# Patient Record
Sex: FEMALE | Race: WHITE | NOT HISPANIC OR LATINO | Employment: STUDENT | URBAN - METROPOLITAN AREA
[De-identification: names, ages, dates, MRNs, and addresses within clinical notes are randomized per-mention and may not be internally consistent; named-entity substitution may affect disease eponyms.]

---

## 2023-07-06 ENCOUNTER — HOSPITAL ENCOUNTER (EMERGENCY)
Facility: CLINIC | Age: 23
Discharge: HOME | End: 2023-07-06
Attending: EMERGENCY MEDICINE | Admitting: EMERGENCY MEDICINE

## 2023-07-06 VITALS
HEIGHT: 64 IN | SYSTOLIC BLOOD PRESSURE: 119 MMHG | DIASTOLIC BLOOD PRESSURE: 72 MMHG | WEIGHT: 127.87 LBS | RESPIRATION RATE: 16 BRPM | HEART RATE: 76 BPM | BODY MASS INDEX: 21.83 KG/M2 | TEMPERATURE: 98.2 F | OXYGEN SATURATION: 99 %

## 2023-07-06 DIAGNOSIS — F41.0 PANIC ATTACK: ICD-10-CM

## 2023-07-06 PROCEDURE — 93005 ELECTROCARDIOGRAM TRACING: CPT

## 2023-07-06 PROCEDURE — 99283 EMERGENCY DEPT VISIT LOW MDM: CPT

## 2023-07-06 RX ORDER — HYDROXYZINE HYDROCHLORIDE 25 MG/1
25 TABLET, FILM COATED ORAL 3 TIMES DAILY PRN
Qty: 30 TABLET | Refills: 0 | Status: SHIPPED | OUTPATIENT
Start: 2023-07-06

## 2023-07-07 ENCOUNTER — HOSPITAL ENCOUNTER (EMERGENCY)
Facility: CLINIC | Age: 23
Discharge: HOME | End: 2023-07-07
Attending: EMERGENCY MEDICINE | Admitting: EMERGENCY MEDICINE

## 2023-07-07 VITALS
HEIGHT: 64 IN | HEART RATE: 101 BPM | SYSTOLIC BLOOD PRESSURE: 165 MMHG | TEMPERATURE: 97 F | DIASTOLIC BLOOD PRESSURE: 83 MMHG | WEIGHT: 131 LBS | BODY MASS INDEX: 22.36 KG/M2 | RESPIRATION RATE: 16 BRPM | OXYGEN SATURATION: 100 %

## 2023-07-07 DIAGNOSIS — F41.1 GENERALIZED ANXIETY DISORDER WITH PANIC ATTACKS: Primary | ICD-10-CM

## 2023-07-07 DIAGNOSIS — F41.0 GENERALIZED ANXIETY DISORDER WITH PANIC ATTACKS: Primary | ICD-10-CM

## 2023-07-07 LAB
ATRIAL RATE - MUSE: 80 BPM
DIASTOLIC BLOOD PRESSURE - MUSE: NORMAL MMHG
INTERPRETATION ECG - MUSE: NORMAL
P AXIS - MUSE: 11 DEGREES
PR INTERVAL - MUSE: 142 MS
QRS DURATION - MUSE: 84 MS
QT - MUSE: 376 MS
QTC - MUSE: 433 MS
R AXIS - MUSE: 66 DEGREES
SYSTOLIC BLOOD PRESSURE - MUSE: NORMAL MMHG
T AXIS - MUSE: 48 DEGREES
VENTRICULAR RATE- MUSE: 80 BPM

## 2023-07-07 PROCEDURE — 99204 OFFICE O/P NEW MOD 45 MIN: CPT | Performed by: PSYCHIATRY & NEUROLOGY

## 2023-07-07 PROCEDURE — 90791 PSYCH DIAGNOSTIC EVALUATION: CPT

## 2023-07-07 PROCEDURE — 99285 EMERGENCY DEPT VISIT HI MDM: CPT | Mod: 25

## 2023-07-07 RX ORDER — ESCITALOPRAM OXALATE 10 MG/1
10 TABLET ORAL DAILY
Qty: 30 TABLET | Refills: 1 | Status: SHIPPED | OUTPATIENT
Start: 2023-07-07

## 2023-07-07 ASSESSMENT — COLUMBIA-SUICIDE SEVERITY RATING SCALE - C-SSRS
TOTAL  NUMBER OF ABORTED OR SELF INTERRUPTED ATTEMPTS LIFETIME: NO
TOTAL  NUMBER OF INTERRUPTED ATTEMPTS LIFETIME: NO
ATTEMPT LIFETIME: NO
2. HAVE YOU ACTUALLY HAD ANY THOUGHTS OF KILLING YOURSELF?: NO
6. HAVE YOU EVER DONE ANYTHING, STARTED TO DO ANYTHING, OR PREPARED TO DO ANYTHING TO END YOUR LIFE?: NO
1. HAVE YOU WISHED YOU WERE DEAD OR WISHED YOU COULD GO TO SLEEP AND NOT WAKE UP?: NO

## 2023-07-07 ASSESSMENT — ACTIVITIES OF DAILY LIVING (ADL)
ADLS_ACUITY_SCORE: 35
ADLS_ACUITY_SCORE: 35

## 2023-07-07 NOTE — PROGRESS NOTES
"22 year old female with history of anxiety received from ED due to increasing panic attacks and anxiety. Reports she has been having panic attacks over the last month and these are triggered by \"various sounds\" such as \"someone scratching their arm or repetitive sounds like a clicking pen\". Pt feels chest tightness when she experiences panic. Pt was in ED yesterday evening and started on hydroxyzine which she says was somewhat helpful. Pt would like to be set up with therapy and psychiatry. Pt denies SI/HI/AH/VH.    Pt calm and cooperative during intake. Agreeable to meeting with [facility] and psychiatric provider. Pt would like to discharge home today.    Nursing and risk assessments completed. Assessments reviewed with [facility] and physician. Admission information reviewed with patient. Patient given a tour of [facility] and instructions on using the facility. Questions regarding [facility] addressed. Pt safety search completed.     "

## 2023-07-07 NOTE — PROGRESS NOTES
Writer met pt in ED waiting area. Pt shared that she was experiencing panic attacks and that they were getting worse. Writer shared resources available at [facility] and described the unit to the patient. The patient expressed a desire to sleep in her own bed and asked if she could come back in the morning. Writer told her that she could and informed her that she would have to wait in the ED again for assessment and that it could be a long time before coming onto the unit. Pt expressed understanding and reiterated that she would prefer to go home and come back as she lives close. Pt denies SI and reported feeling safe at home. Writer informed ED RN and Dr. Forrest [facility]

## 2023-07-07 NOTE — DISCHARGE INSTRUCTIONS
Please follow up with the psychiatrist that your father as spoken with    Here is a resource for therapy options that provide reduced cost services.    [facility]    Reduce Extreme Emotion  QUICKLY:  Changing Your Body Chemistry      T:  Change your body Temperature to change your autonomic nervous system   Use Ice Water to calm yourself down FAST   Put your face in a bowl of ice water (this is the best way; have the person keep his/her face in ice water for 30-45 seconds - initial research is showing that the longer s/he can hold her/his face in the water, the better the response), or   Splash ice water on your face, or hold an ice pack on your face      I:  Intensely exercise to calm down a body revved up by emotion   Examples: running, walking fast, jumping, playing basketball, weight lifting, swimming, calisthenics, etc.   Engage in exercises that DO NOT include violent behaviors. Exercises that utilize violent behaviors tend to function as  behavioral rehearsal,  and rather than calming the person down, may actually  rev  the person up more, increasing the likelihood of violence, and lessening the likelihood that they will  burn off  energy     P:  Progressively relax your muscles   Starting with your hands, moving to your forearms, upper arms, shoulders, neck, forehead, eyes, cheeks and lips, tongue and teeth, chest, upper back, stomach, buttocks, thighs, calves, ankles, feet   Tense (10 seconds,   of the way), then relax each muscle (all the way)   Notice the tension   Notice the difference when relaxed (by tensing first, and then relaxing, you are able to get a more thorough relaxation than by simply relaxing)      P: Paced breathing to relax   The standard technique is to begin with counting the number of steps one takes for a typical inhale, then counting the steps one takes for a typical exhale, and then lengthening the amount of steps for the exhalation by one or two steps.  OR  Repeat this  pattern for 1-2 minutes  Inhale for four (4) seconds   Exhale for six (6) to eight (8) seconds   Research demonstrated that one can change one's overall level of anxiety by doing this exercise for even a few minutes per day      Grounding Techniques:  Try to notice where you are, your surroundings including the people, the sounds like the TV or radio.  Concentrate on your breathing. Take a deep cleansing breath from your diaphragm. Count the breaths as you exhale. Make sure you breath slowly.  Hold something that you find comforting, for some it may be a stuffed animal or a blanket. Notice how it feels in your hands. Is it hard or soft?  During a non-crisis time make a list of positive affirmations. Print them out and keep them handy for times of intense anxiety. At those times, read them aloud.  Try the Toura game:  Name 5 things you can see in the room with you  Name 4 things you can feel ( chair on my back  or  feet on floor )   Name 3 things you can hear right now ( people talking  or  tv )   Name 2 things you can smell right now (or, 2 things you like the smell of)   Name 1 good thing about yourself  Create A Safe Place  Image a safe place -- it can be a real or imaginary place:   What do you see -- especially colors?   What sounds do you hear?   What sensations do you feel?   What smells do you smell?   What people or animals would you want in your safe place?   Imagine a protective bubble, wall or boundary around your safe place.   Imagine a door or gate with a guard at your safe place.   Image a lock and key to your safe place and only you can unlock it.  You can draw or make a collage that represents your safe place.   Choose a souvenir of your safe place -- a color, an object, a song.   Keep your image of your safe place so you can come back to it when you need to.

## 2023-07-07 NOTE — PROGRESS NOTES
Discharge instructions reviewed with patient including follow-up care plan. Educated on medication regimen and advised not to stop prescribed medication without consulting their physician. Reviewed safety plan and outpatient resources. Patient gave verbal understanding of discharge instructions, medication regimen, and follow up care plan. Denies SI. All belongings which were brought into the hospital have been returned to patient. Escorted off the unit at  3:10 PM accompanied by JOSE Huber.  Discharged to home in stable condition via private car driven by step-father.

## 2023-07-07 NOTE — ED TRIAGE NOTES
Pt states she feels she is having a panic attack, reports she was seen in [facility] yesterday.  Denies SI.     Triage Assessment     Row Name 07/07/23 1119       Triage Assessment (Adult)    Airway WDL WDL       Respiratory WDL    Respiratory WDL WDL       Skin Circulation/Temperature WDL    Skin Circulation/Temperature WDL WDL       Cardiac WDL    Cardiac WDL WDL       Peripheral/Neurovascular WDL    Peripheral Neurovascular WDL WDL       Cognitive/Neuro/Behavioral WDL    Cognitive/Neuro/Behavioral WDL WDL

## 2023-07-07 NOTE — ED PROVIDER NOTES
History   Chief Complaint:  Panic Attack     HPI   Beata Cain is a 22 year old female who presents to the ED for panic attacks. The patient was seen last night in the ED for worsening panic attacks over the past month accompanied by minor chest pain. [facility] was recommended last evening, but due to the long wait the patient preferred to sleep at home and come back in the morning. She was prescribed hydroxyzine last night, and took a dose this morning. She does not have a history of depression and had not seen a mental health provider for her panic attacks. She denies suicidal ideation, homicidal ideation, or excessive alcohol consumption. She also denies fever, cough, sore throat, shortness of breath, abdominal pain, vomiting, or diarrhea.    Independent Historian:   None - Patient Only    Review of External Notes:   [facility] ED visit note (7/6/2023)     Medications:    Hydroxyzine    Past Medical History:    Panic attacks    Past Surgical History:    History reviewed; no pertinent past surgical history.    Physical Exam     Patient Vitals for the past 24 hrs:   BP Temp Temp src Pulse Resp SpO2   07/07/23 1119 113/66 97  F (36.1  C) Temporal 82 18 98 %      Physical Exam  General: Alert, appears well-developed and well-nourished. Cooperative.     In no acute distress  HEENT:  Head:  Atraumatic  Ears:  External ears are normal  Mouth/Throat:  Oropharynx is without erythema or exudate and mucous membranes are moist.   Eyes:   Conjunctivae normal and EOM are normal. No scleral icterus.  CV:  Normal rate, regular rhythm, normal heart sounds and radial pulses are 2+ and symmetric.  No murmur.  Resp:  Breath sounds are clear bilaterally    Non-labored, no retractions or accessory muscle use  GI:  Abdomen is soft, no distension, no tenderness. No rebound or guarding.  No CVA tenderness bilaterally  MS:  Normal range of motion. No edema.    Normal strength in all 4 extremities.     Back  atraumatic.    No midline cervical, thoracic, or lumbar tenderness  Skin:  Warm and dry.  No rash or lesions noted.  Neuro: Alert. Normal strength.  GCS: 15  Psych:  Patient notes intermittent anxiety attacks and panic attacks.  Denies SI/HI.  Denies hallucinations.    Emergency Department Course   Emergency Department Course & Assessments:     Assessments:  1155 I obtained patient history and examined the patient as noted above.    Independent Interpretation (X-rays, CTs, rhythm strip):  None    Consultations/Discussion of Management or Tests:  None     Social Determinants of Health affecting care:   None    Disposition:  The patient was transferred to [facility].     Impression & Plan    Medical Decision Making:  Beata Cain is a 22 year old female who presents for evaluation of anxiety.  There is a history of anxiety in the past and they are on medications.  She was referred to [facility] yesterday but declined to go as she cited wanting to sleep at home.  She is willing to go to [facility] today for further mental health evaluation.  There are no signs at this point of a general medical problem causing her anxiety.  No indication for laboratory work-up or further advanced imaging.  Patient is medically cleared at this time.  Transferred to [facility] for definitive psychiatric evaluation and management.      Diagnosis:    ICD-10-CM    1. Generalized anxiety disorder with panic attacks  F41.1     F41.0         Scribe Disclosure:  I, Reji Bo, am serving as a scribe at 12:17 PM on 7/7/2023 to document services personally performed by Jose Willingham MD based on my observations and the provider's statements to me.     7/7/2023   Jose Willingham MD White, Scott, MD  07/07/23 2851

## 2023-07-07 NOTE — ED TRIAGE NOTES
[facility]  ED Arrival Note    Arrived to triage c/o increasing panic attacks for about a month. She says that they usually last about a few minutes, and are mostly triggered by certain sounds or movements.  Pt states that during attacks, she feels like she is unable to breath, chest pain and heart racing. Pt does not have a way of managing the symptoms.     Visitors during triage: Mother and Father      Triage Interventions: EKG    Ambulatory: Yes    Directed to: Triage Lobby    Pronouns: she/her       Triage Assessment     Row Name 07/06/23 1951       Triage Assessment (Adult)    Airway WDL WDL       Respiratory WDL    Respiratory WDL WDL;rhythm/pattern    Rhythm/Pattern, Respiratory depth regular;pattern regular       Cardiac WDL    Cardiac WDL WDL;rhythm    Pulse Rate & Regularity radial pulse regular       Cognitive/Neuro/Behavioral WDL    Cognitive/Neuro/Behavioral WDL WDL;all       [place] Coma Scale    Best Eye Response 4-->(E4) spontaneous    Best Motor Response 6-->(M6) obeys commands    Best Verbal Response 5-->(V5) oriented    [place] Coma Scale Score 15

## 2023-07-07 NOTE — ED PROVIDER NOTES
"  History     Chief Complaint:  Panic Attack     The history is provided by the patient.      Beata Cain is a 22 year old female who presents with panic attacks. Patient reports worsening panic attacks worsening for the past month where she has tremors, chest pain, palpitations, trouble breathing, and headache. Chest pain resolves after a few minutes. No trouble eating or sleeping. Patient denies possibility of pregnancy. Of note, patient moved to the  1 year ago from [facility] to be with her family who had already moved here. She notes she has had panic attacks prior to the move as well. Patient is not on psychiatric medications and does not see a therapist. Patient denies significant weight change. No family history of thyroid issues.  No suicidal thoughts.    Independent Historian:   None - Patient Only    Review of External Notes:   Only record is a vaccine visit in April     Medications:    The patient is not currently taking any prescribed medications.     Past Medical History:    No pertinent past medical history     Physical Exam     Patient Vitals for the past 24 hrs:   BP Temp Temp src Pulse Resp SpO2 Height Weight   07/06/23 1959 119/72 98.2  F (36.8  C) Oral 76 16 99 % 1.62 m (5' 3.78\") 58 kg (127 lb 13.9 oz)      Physical Exam  Eyes:               Sclera white; Pupils are equal and round  ENT:                External ears and nares normal, no goiter  CV:                  Rate as above with regular rhythm   Resp:               Breath sounds clear and equal bilaterally                          Non-labored, no retractions or accessory muscle use  GI:                   Abdomen is soft, non-tender, non-distended                          No rebound tenderness or peritoneal features  MS:                  Moves all extremities  Skin:                Warm and dry  Neuro:             Speech is normal and fluent. No apparent deficit.     Emergency Department Course   ECG  ECG results from 07/06/23   EKG " 12-lead, tracing only     Value    Systolic Blood Pressure     Diastolic Blood Pressure     Ventricular Rate 80    Atrial Rate 80    PA Interval 142    QRS Duration 84        QTc 433    P Axis 11    R AXIS 66    T Axis 48    Interpretation ECG      Sinus rhythm  Normal ECG  No previous ECGs available  Read at 2007 by Dr. Forrest      Emergency Department Course & Assessments:     Interventions:  Medications - No data to display     Independent Interpretation (X-rays, CTs, rhythm strip):  None    Assessments/Consultations/Discussion of Management or Tests:  ED Course as of 07/06/23 2010   Thu Jul 06, 2023   1950 I briefly obtained history and examined the patient in triage.      Social Determinants of Health affecting care:   None    [facility]position:  [facility]     Impression & Plan      Medical Decision Making:  EKG w/o ischemia, dysrhythmia, or pericarditis.  No report or findings on exam of self harm. No symptoms or findings of traumatic, infectious, metabolic, or endocrine pathology.  Blood work not indicated.  Medically clear for [facility].    After talking with [facility] providers, I was approached by [facility].  Patient would prefer to sleep at home tonight and come back in the morning for further assessment.  Is not holdable.  In case she is delayed with coming back, outpatient referral was placed.  Hydroxyzine prescribed to use as needed until more definitive follow-up is arranged.    Diagnosis:    ICD-10-CM    1. Panic attack  F41.0 Adult Mental Health  Referral           Scribe Disclosure:  I, Huy Rebekah, am serving as a scribe at 7:56 PM on 7/6/2023 to document services personally performed by Arlet Forrest MD based on my observations and the provider's statements to me.     7/6/2023   Arlet Forrest MD Gosen, Christine Leigh, MD  07/06/23 7049
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
